# Patient Record
Sex: FEMALE | Race: OTHER | HISPANIC OR LATINO | URBAN - METROPOLITAN AREA
[De-identification: names, ages, dates, MRNs, and addresses within clinical notes are randomized per-mention and may not be internally consistent; named-entity substitution may affect disease eponyms.]

---

## 2021-06-04 ENCOUNTER — EMERGENCY (EMERGENCY)
Facility: HOSPITAL | Age: 26
LOS: 0 days | Discharge: HOME | End: 2021-06-04
Attending: EMERGENCY MEDICINE
Payer: MEDICAID

## 2021-06-04 VITALS
HEIGHT: 64 IN | OXYGEN SATURATION: 99 % | RESPIRATION RATE: 16 BRPM | WEIGHT: 147.05 LBS | HEART RATE: 75 BPM | TEMPERATURE: 98 F | SYSTOLIC BLOOD PRESSURE: 94 MMHG | DIASTOLIC BLOOD PRESSURE: 59 MMHG

## 2021-06-04 VITALS
DIASTOLIC BLOOD PRESSURE: 61 MMHG | RESPIRATION RATE: 18 BRPM | OXYGEN SATURATION: 99 % | HEART RATE: 71 BPM | SYSTOLIC BLOOD PRESSURE: 101 MMHG | TEMPERATURE: 98 F

## 2021-06-04 DIAGNOSIS — O99.891 OTHER SPECIFIED DISEASES AND CONDITIONS COMPLICATING PREGNANCY: ICD-10-CM

## 2021-06-04 DIAGNOSIS — R11.10 VOMITING, UNSPECIFIED: ICD-10-CM

## 2021-06-04 DIAGNOSIS — R55 SYNCOPE AND COLLAPSE: ICD-10-CM

## 2021-06-04 DIAGNOSIS — Z3A.24 24 WEEKS GESTATION OF PREGNANCY: ICD-10-CM

## 2021-06-04 LAB
ADD ON TEST-SPECIMEN IN LAB: SIGNIFICANT CHANGE UP
ALBUMIN SERPL ELPH-MCNC: 2.8 G/DL — LOW (ref 3.3–5)
ALP SERPL-CCNC: 101 U/L — SIGNIFICANT CHANGE UP (ref 40–120)
ALT FLD-CCNC: 114 U/L — HIGH (ref 12–78)
ANION GAP SERPL CALC-SCNC: 6 MMOL/L — SIGNIFICANT CHANGE UP (ref 5–17)
APPEARANCE UR: CLEAR — SIGNIFICANT CHANGE UP
AST SERPL-CCNC: 65 U/L — HIGH (ref 15–37)
BASOPHILS # BLD AUTO: 0.05 K/UL — SIGNIFICANT CHANGE UP (ref 0–0.2)
BASOPHILS NFR BLD AUTO: 0.5 % — SIGNIFICANT CHANGE UP (ref 0–2)
BILIRUB SERPL-MCNC: 0.2 MG/DL — SIGNIFICANT CHANGE UP (ref 0.2–1.2)
BILIRUB UR-MCNC: NEGATIVE — SIGNIFICANT CHANGE UP
BUN SERPL-MCNC: 7 MG/DL — SIGNIFICANT CHANGE UP (ref 7–23)
CALCIUM SERPL-MCNC: 8.4 MG/DL — LOW (ref 8.5–10.1)
CHLORIDE SERPL-SCNC: 109 MMOL/L — HIGH (ref 96–108)
CO2 SERPL-SCNC: 22 MMOL/L — SIGNIFICANT CHANGE UP (ref 22–31)
COLOR SPEC: YELLOW — SIGNIFICANT CHANGE UP
CREAT SERPL-MCNC: 0.51 MG/DL — SIGNIFICANT CHANGE UP (ref 0.5–1.3)
DIFF PNL FLD: NEGATIVE — SIGNIFICANT CHANGE UP
EOSINOPHIL # BLD AUTO: 0.07 K/UL — SIGNIFICANT CHANGE UP (ref 0–0.5)
EOSINOPHIL NFR BLD AUTO: 0.7 % — SIGNIFICANT CHANGE UP (ref 0–6)
GLUCOSE SERPL-MCNC: 73 MG/DL — SIGNIFICANT CHANGE UP (ref 70–99)
GLUCOSE UR QL: NEGATIVE MG/DL — SIGNIFICANT CHANGE UP
HCT VFR BLD CALC: 34.1 % — LOW (ref 34.5–45)
HGB BLD-MCNC: 11.6 G/DL — SIGNIFICANT CHANGE UP (ref 11.5–15.5)
IMM GRANULOCYTES NFR BLD AUTO: 0.6 % — SIGNIFICANT CHANGE UP (ref 0–1.5)
KETONES UR-MCNC: NEGATIVE — SIGNIFICANT CHANGE UP
LEUKOCYTE ESTERASE UR-ACNC: ABNORMAL
LIDOCAIN IGE QN: 93 U/L — SIGNIFICANT CHANGE UP (ref 73–393)
LYMPHOCYTES # BLD AUTO: 19.7 % — SIGNIFICANT CHANGE UP (ref 13–44)
LYMPHOCYTES # BLD AUTO: 2.04 K/UL — SIGNIFICANT CHANGE UP (ref 1–3.3)
MCHC RBC-ENTMCNC: 31.1 PG — SIGNIFICANT CHANGE UP (ref 27–34)
MCHC RBC-ENTMCNC: 34 GM/DL — SIGNIFICANT CHANGE UP (ref 32–36)
MCV RBC AUTO: 91.4 FL — SIGNIFICANT CHANGE UP (ref 80–100)
MONOCYTES # BLD AUTO: 0.58 K/UL — SIGNIFICANT CHANGE UP (ref 0–0.9)
MONOCYTES NFR BLD AUTO: 5.6 % — SIGNIFICANT CHANGE UP (ref 2–14)
NEUTROPHILS # BLD AUTO: 7.57 K/UL — HIGH (ref 1.8–7.4)
NEUTROPHILS NFR BLD AUTO: 72.9 % — SIGNIFICANT CHANGE UP (ref 43–77)
NITRITE UR-MCNC: NEGATIVE — SIGNIFICANT CHANGE UP
PH UR: 7 — SIGNIFICANT CHANGE UP (ref 5–8)
PLATELET # BLD AUTO: 326 K/UL — SIGNIFICANT CHANGE UP (ref 150–400)
POTASSIUM SERPL-MCNC: 3.7 MMOL/L — SIGNIFICANT CHANGE UP (ref 3.5–5.3)
POTASSIUM SERPL-SCNC: 3.7 MMOL/L — SIGNIFICANT CHANGE UP (ref 3.5–5.3)
PROT SERPL-MCNC: 6.7 GM/DL — SIGNIFICANT CHANGE UP (ref 6–8.3)
PROT UR-MCNC: 30 MG/DL
RAPID RVP RESULT: SIGNIFICANT CHANGE UP
RBC # BLD: 3.73 M/UL — LOW (ref 3.8–5.2)
RBC # FLD: 13.1 % — SIGNIFICANT CHANGE UP (ref 10.3–14.5)
SARS-COV-2 RNA SPEC QL NAA+PROBE: SIGNIFICANT CHANGE UP
SODIUM SERPL-SCNC: 137 MMOL/L — SIGNIFICANT CHANGE UP (ref 135–145)
SP GR SPEC: 1 — LOW (ref 1.01–1.02)
UROBILINOGEN FLD QL: NEGATIVE MG/DL — SIGNIFICANT CHANGE UP
WBC # BLD: 10.37 K/UL — SIGNIFICANT CHANGE UP (ref 3.8–10.5)
WBC # FLD AUTO: 10.37 K/UL — SIGNIFICANT CHANGE UP (ref 3.8–10.5)

## 2021-06-04 PROCEDURE — 99284 EMERGENCY DEPT VISIT MOD MDM: CPT | Mod: 25

## 2021-06-04 PROCEDURE — 85025 COMPLETE CBC W/AUTO DIFF WBC: CPT

## 2021-06-04 PROCEDURE — 76805 OB US >/= 14 WKS SNGL FETUS: CPT

## 2021-06-04 PROCEDURE — 86901 BLOOD TYPING SEROLOGIC RH(D): CPT

## 2021-06-04 PROCEDURE — 99285 EMERGENCY DEPT VISIT HI MDM: CPT

## 2021-06-04 PROCEDURE — 80053 COMPREHEN METABOLIC PANEL: CPT

## 2021-06-04 PROCEDURE — 76700 US EXAM ABDOM COMPLETE: CPT | Mod: 26

## 2021-06-04 PROCEDURE — 93005 ELECTROCARDIOGRAM TRACING: CPT

## 2021-06-04 PROCEDURE — 83690 ASSAY OF LIPASE: CPT

## 2021-06-04 PROCEDURE — 93010 ELECTROCARDIOGRAM REPORT: CPT

## 2021-06-04 PROCEDURE — 76805 OB US >/= 14 WKS SNGL FETUS: CPT | Mod: 26

## 2021-06-04 PROCEDURE — 82962 GLUCOSE BLOOD TEST: CPT

## 2021-06-04 PROCEDURE — 86900 BLOOD TYPING SEROLOGIC ABO: CPT

## 2021-06-04 PROCEDURE — 85379 FIBRIN DEGRADATION QUANT: CPT

## 2021-06-04 PROCEDURE — 87086 URINE CULTURE/COLONY COUNT: CPT

## 2021-06-04 PROCEDURE — 86850 RBC ANTIBODY SCREEN: CPT

## 2021-06-04 PROCEDURE — 76700 US EXAM ABDOM COMPLETE: CPT

## 2021-06-04 PROCEDURE — 36415 COLL VENOUS BLD VENIPUNCTURE: CPT

## 2021-06-04 PROCEDURE — 81001 URINALYSIS AUTO W/SCOPE: CPT

## 2021-06-04 PROCEDURE — 0225U NFCT DS DNA&RNA 21 SARSCOV2: CPT

## 2021-06-04 RX ORDER — SODIUM CHLORIDE 9 MG/ML
1000 INJECTION INTRAMUSCULAR; INTRAVENOUS; SUBCUTANEOUS ONCE
Refills: 0 | Status: COMPLETED | OUTPATIENT
Start: 2021-06-04 | End: 2021-06-04

## 2021-06-04 RX ADMIN — SODIUM CHLORIDE 1000 MILLILITER(S): 9 INJECTION INTRAMUSCULAR; INTRAVENOUS; SUBCUTANEOUS at 14:41

## 2021-06-04 NOTE — ED ADULT TRIAGE NOTE - CHIEF COMPLAINT QUOTE
Pt. to the ED C/O Syncope Episode while shopping at Store- Pt. C/O Lower Abdominal Pain with radiation to the back- Pt. reports to be 24 weeks pregnant- Dr. Gaviria GYN- Pt. reports + fetal Movement

## 2021-06-04 NOTE — ED PROVIDER NOTE - CLINICAL SUMMARY MEDICAL DECISION MAKING FREE TEXT BOX
27 y/o F with no significant PMHx  presents to the ED BIBA from a store for evaluation s/p syncopal episode . Pt reports feeling fine right now and has moderate back pain. Pt states she was in a store and was feeling dizzy, and (+) LOC.     Plan: 25 y/o F,  24 weeks gestation, otherwise with no significant PMHx  presents to the ED BIBA from a store for evaluation s/p syncopal episode . Pt reports feeling fine right now and has moderate back pain. Pt states she was in a store and was feeling dizzy, and (+) LOC.  Poor PO intake today while outside in hot weather. PE 9+) gravid. nontender. Neuro nonfocal.     Plan: EKG, US abd /OB. Labs including lipase, urine, RVP/COVID swab, TNS. IV fluid, monitor observe and reassess. fetal heart rate check ob consult.

## 2021-06-04 NOTE — ED PROVIDER NOTE - PATIENT PORTAL LINK FT
You can access the FollowMyHealth Patient Portal offered by [facility] by registering at the following website: [email or web address]. By joining [employer]’s FollowMyHealth portal, you will also be able to view your health information using other applications (apps) compatible with our system.

## 2021-06-04 NOTE — ED PROVIDER NOTE - CARE PLAN
Principal Discharge DX:	Syncope, unspecified syncope type  Secondary Diagnosis:	Third trimester pregnancy

## 2021-06-04 NOTE — ED PROVIDER NOTE - PSYCHIATRIC NEGATIVE STATEMENT, MLM
----- Message from Margaret Mendez MD sent at 12/19/2018  7:56 AM EST -----  Labs were stable except blood sugar 180 and A1c has jumped up to 8.1.   May want to increase Lantus to 66 units on a daily basis and recheck fasting blood sugar and A1c in 3 months no known mental health issues.

## 2021-06-04 NOTE — ED PROVIDER NOTE - ENMT, MLM
Normocephalic atraumatic head, oropharynx clear, mucus membranes slightly dry, no clinical evidence of facial injury,  negative roberts and negative raccoon

## 2021-06-04 NOTE — ED PROVIDER NOTE - MUSCULOSKELETAL, MLM
No focal tenderness, neck non tender supple without pain, no focal extremity tenderness or deformity, BL SLR 45 degrees, normal motor

## 2021-06-04 NOTE — ED ADULT NURSE NOTE - OBJECTIVE STATEMENT
Pt. to the ED s/p syncope while shopping at store. Pt. reports to be 24 weeks pregnant. +fetal movement. denies pain at this time. pt states that she ate breakfast this AM but has not eating lunch. does not feel like she was hydrating enough today.

## 2021-06-04 NOTE — ED PROVIDER NOTE - OBJECTIVE STATEMENT
25 y/o F with no significant PMHx  presents to the ED BIBA from a store for evaluation s/p syncopal episode . Pt reports feeling fine right now and has moderate back pain. Pt states she was in a store and was feeling dizzy, and (+) LOC. Duration of syncopal episode unknown. Unknown is any seizure activity. Pt denies a seizure hx. Pt is pregnant with 1 baby and this is the pt's second pregnancy. Pt denies experiencing problems with 1st pregnancy.  Pt reports pain below belly after LOC at the store. No vaginal bleeding or fever. No CP or SOB. Mild transient HA self resolved. Pt has not received COVID-19 vaccines.  Pt reports she ate breakfast this morning at 10AM and a consuming a little bit of water.  Pt is 24 weeks pregnant and reports positive fetal movement NKDA. OBGYN: Dr. Gaviria.  #:[id number]

## 2021-06-04 NOTE — ED PROVIDER NOTE - NSFOLLOWUPINSTRUCTIONS_ED_ALL_ED_FT
Drink more oral fluids.  Take it easy next 2 days.  Follow up Monday with Dr. Gaviria, your Ob doctor.        Síncope    LO QUE NECESITA SABER:    El síncope también se conoce edvin desmayo o pérdida de la consciencia. El síncope es la pérdida repentina y temporal de la consciencia, seguida por amrita caída estando en amrita posición de pie o sentado. El síncope puede no tener gravedad o ser amrita señal de amrita afección más grave que debe tratarse. Usted puede controlar ciertas afecciones médicas que provocan síncope. Gloria médicos pueden ayudarlo a crear un plan para controlar el síncope y evitar la ocurrencia de episodios.    INSTRUCCIONES SOBRE EL MARILEE HOSPITALARIA:    Busque atención médica de inmediato si:  •Está sangrando porque se golpeó la daryl cuando se desmayó.      •Usted repentinamente tiene doble visión, dificultad para hablar, adormecimiento y no puede  gloria brazos o piernas.      •Usted tiene dolor en el pecho y dificultad para respirar.      •Usted vomita sharyn o un material que parece café molido.      •Usted ve sharyn en gloria deposiciones.      Comuníquese con farrell médico si:  •Usted tiene nuevos síntomas o gloria síntomas empeoran.      •Tiene otro episodio de síncope.      •Tiene dolor de daryl, ritmo cardíaco acelerado o se siente demasiado mareado para ponerse pie.      •Usted tiene preguntas o inquietudes acerca de farrell condición o cuidado.      Medicamentos:  •Los medicamentospodrían administrarse para ayudar a que farrell corazón beverly con fuerza y regularidad. Farrell médico podría hacer cambios a cualquier medicamento que le esté causando síncope.      •Emison gloria medicamentos edvin se le haya indicado.Consulte con farrell médico si usted fam que farrell medicamento no le está ayudando o si presenta efectos secundarios. Infórmele si es alérgico a cualquier medicamento. Mantenga amrita lista actualizada de los medicamentos, las vitaminas y los productos herbales que dharmesh. Incluya los siguientes datos de los medicamentos: cantidad, frecuencia y motivo de administración. Traiga con usted la lista o los envases de las píldoras a gloria citas de seguimiento. Lleve la lista de los medicamentos con usted en anurag de amrita emergencia.      Acuda a gloria consultas de control con farrell médico según le indicaron.Anote gloria preguntas para que se acuerde de hacerlas ancelmo gloria visitas.    Control del síncope:  •Lleve un registro de los episodios de síncope.Incluya los síntomas y la actividad que realiza antes y después del episodio. El registro puede ayudar a farrell médico a determinar la causa del síncope y ayudarlo a usted a controlar los episodios.      •Siéntese o acuéstese cuando sea necesario.[place]ec incluye cuando se sienta mareado, farrell garganta se cierre o note cambios en farrell visión. Eleve gloria piernas por encima del nivel de farrell corazón.      •Inhale lenta y profundamente si comienza a respirar más rápido a causa de la ansiedad o el temor.Lebec puede disminuir el mareo y la sensación de que se va a desmayar.      •Revise farrell presión arterial con frecuencia.[place]ebec es importante si usted dharmesh medicamentos para bajar la presión arterial. Revise farrell presión arterial cuando esté acostado y cuando esté de pie. Pregunte con que frecuencia debe tomarse la presión ancelmo el día. Mantenga un registro de los valores numéricos de farrell presión arterial. Farrell médico puede usar la información del registro edvin amrita base para planificar farrell tratamiento.  Cómo agapito la presión arterial           Prevención de un episodio de síncope:  •Muévase lentamente y acostúmbrese a amrita posición antes de moverse a otra.Lebec es muy importante cuando usted se cambie de amrita posición acostada o sentada a amrita posición de pie. Respire profundo varias veces antes de ponerse de pie después de susy estado acostado. Póngase de pie lentamente. Los movimientos repentinos podrían causar desmayos. Siéntese en el borde de la cama o del sofá por unos minutos antes de ponerse de pie. En anurag que esté guardando cama, debe tratar de estar en amrita posición vertical por lo menos por 2 horas todos los días o edvin se lo indicaron. No inmovilice las piernas cuando esté de pie ancelmo un segun periodo de tiempo. Mueva las piernas y doble las rodillas para mantener el flujo de sharyn.      •Siga las recomendaciones de farrell médico.El médico puede recomendarle que ingiera más líquidos para evitar la deshidratación. Es probable que usted también deba aumentar farrell consumo de sal para evitar que farrell presión arterial baje demasiado y ocurra un síncope. El médico le dirá cuánto líquido y sodio debe consumir cada día. También le dirá cuánta actividad física es carrera para usted. [place] dependerá de la causa de farrell síncope.      •Esté atento a los signos de bajo nivel de azúcar en la sharyn.Los signos incluyen hambre, nerviosismo, sudoración y latidos cardíacos rápidos o palpitantes. Consulte con farrell médico sobre métodos para mantener estable farrell nivel de azúcar en la sharyn.      •No se esfuerce si está estreñido.Puede desmayarse si usted hace fuerza para realizar amrita evacuación intestinal. Caminar es lo mejor que usted puede hacer para que gloria intestinos se muevan. Consuma alimentos ricos en fibra para facilitar las evacuaciones intestinales. Los cereales altos en fibra, los frijoles, las verduras y los panes integrales son buenos ejemplos. El jugo de ciruelas pasas también puede suavizar las evacuaciones intestinales.      •Tenga cuidado cuando hace calor.El calor puede causar un episodio de síncope. Limite la actividad que realiza al aire pearl en días calurosos. La actividad física en días calurosos puede conducir a la deshidratación. [place]bec puede provocar un episodio.          Agotamiento por calor    LO QUE NECESITA SABER:    El agotamiento por calor es cuando el cuerpo se calienta en exceso. Normalmente, el cuerpo tiene un sistema de refrigeración que es controlado por el cerebro. El sistema de refrigeración se ajusta a las condiciones de calor y baja farrell temperatura corporal produciendo sudor. Con el golpe de calor, el sistema de enfriamiento del cuerpo no funciona tino y, edvin resultado, aumenta la temperatura corporal.    INSTRUCCIONES SOBRE EL MARILEE HOSPITALARIA:    Llame al número de emergencias local (911 en los [place]) si:  •Usted tiene dificultad para respirar.      •Usted está confundido o no puede pensar con claridad.      •Usted no puede  los brazos y las piernas.      Regrese a la kathy de emergencias si:  •Usted no puede dejar de vomitar.          Llame a farrell médico si:  •Gloria signos y síntomas no mejoran con el tratamiento.      •Usted siente entumecimiento u hormigueo en gloria brazos o piernas.      •Usted tiene preguntas o inquietudes acerca de farrell condición o cuidado.      Primeros auxilios para el agotamiento por calor:  •Muévase a un ambiente con aire acondicionado o un área fresca y sombreada y acuéstese. Eleve gloria piernas por encima del nivel de farrell corazón.      •Emison amrita bebida fría, edvin agua o bebidas deportivas.      •Rociése con agua fría o échese agua fresca en farrell daryl, efren y ropa.      •Afloje o quítese toda la ropa que sea posible.      •Si no se mejora dentro de 1 hora, vaya al departamento de emergencia.      Evite el agotamiento por calor:  •Vista ropa suelta, liviana y de colores keyon.      •Proteja farrell daryl y efren con un sombrero o sombrilla cuando esté afuera.      •Emison mucha agua o bebidas deportivas. Evite las bebidas alcohólicas.      •Coma alimentos salados, edvin galletas saladas y pretzel salados.      •Limite gloria actividades ancelmo las horas más calurosas del día. [place]bec generalmente es tarde en la mañana hasta temprano en la tarde.      •Utilice aire acondicionado o ventiladores y tenga suficiente ventilación. Si no hay aire acondicionado disponible, mantenga gloria ventanas abiertas para que el aire pueda circular.      Acuda a la consulta de control con farrell médico según las indicaciones:Anote gloria preguntas para que se acuerde de hacerlas ancelmo gloria visitas.            Tercer trimestre de embarazo    Third Trimester of Pregnancy       El tercer trimestre del embarazo comprende desde la semana 28 hasta la semana 40 (desde el mes 7 hasta el mes 9). En roman trimestre, el bebé en gestación (feto) crece muy rápidamente. Hacia el final del noveno mes, el bebé en gestación mide alrededor de 20 pulgadas (45 cm) de segun. Pesa entre 6 y 10 libras (2,70 y 4,50 kg).      Cambios en el cuerpo ancelmo el tercer trimestre    Ancelmo el tercer trimestre, farrell cuerpo continúa experimentando numerosos cambios. Estos cambios varían de amrita peewee a otra.    Cambios físicos     •Seguirá aumentando de peso. Puede ser que aumente entre 25 y 35 libras (11 y 16 kg) hacia el final del embarazo.      •Podrán aparecer las primeras estrías en las caderas, el vientre (abdomen) y las mamas.      •Las mamas seguirán creciendo y se pondrán más sensibles.      •El jarod se le puede engrosar o afinar, o tener amrita textura diferente. Lo probable es que el jarod se le normalice después del nacimiento del bebé.      •El ombligo puede salir hacia afuera.       •Puede observar que se le hinchan las maxim, el jason o los tobillos.      Cambios en la claudia     •Puede comenzar a tener o continuar teniendo acidez estomacal.      •Puede tener o continuar teniendo problemas para defecar (estreñimiento).      •Pueden aparecerle hemorroides. Estas son venas hinchadas en el ano que pueden picar o doler.      •Puede comenzar a tener venas hinchadas (venas varicosas) en las piernas.      •Puede presentar más dolor en la pelvis, la espalda o los muslos.      •Puede presentar más hormigueo o entumecimiento en las maxim, los brazos y las piernas. La piel de farrell vientre también puede sentirse entumecida.      •Es posible que sienta falta de aire a medida que el útero se agranda.      Otros cambios     •Es posible que rob pis (orine) con mayor frecuencia.      •Puede tener más problemas para dormir.      •Puede notar que el feto “baja” o lo siente moverse más bajo, en el vientre.      •Puede ser que le salga más líquido de la vagina.      •Puede sentir las articulaciones flojas y puede sentir dolor alrededor del hueso pélvico.        Qué debe esperar en las visitas prenatales  Verá al médico cada 2 semanas hasta la semana 36. Después de la semana 36, verá al médico todas las semanas. Ancelmo estas visitas, el médico:•Le hará un examen físico. [place] requiere el control de:  •Farrell peso.      •Los latidos cardíacos del bebé.      •El efren uterino, si está cerca de la fecha de parto.      •Le hará pruebas, edvin, por ejemplo:  •Análisis de sharyn.      •Prueba de estreptococos del holly B.      •Prueba no estresante (PNE). La prueba sin estrés controla la claudia del bebé para asegurarse de que no haya signos de problemas.        •Le hará preguntas sobre farrell claudia, farrell embarazo y farrell plan de nacimiento.        ¿Cuáles son los signos del trabajo de parto?  Si tiene alguno de estos signos, llame a farrell médico de inmediato, incluso si es antes de la fecha de parto.  •Cólicos en el abdomen.      •Endurecimiento del útero o contracciones regulares. Estas contracciones pueden comenzar en intervalos de 10 minutos y volverse más divya y más frecuentes con el tiempo.      •Contracciones que comienzan en la parte superior del útero y se extienden hacia abajo, a la tasha inferior del vientre y la espalda.      •Más presión en la pelvis y un dolor sordo en la parte baja de la espalda.      •Amrita mucosidad acuosa o con sharyn que sale de la vagina.      •Ruptura de la bolsa de ryland. [place] puede ser un chorro o un goteo hunter y lento de líquido. Informe al médico si tiene color o un olor extraño.        ¿Qué es el falso trabajo de parto?    El falso trabajo de parto es amrita afección en la que se sienten contracciones pequeñas e irregulares que generalmente desaparecen al hacer reposo, al cambiar de posición o al beber agua. Las contracciones pueden durar horas, días o incluso semanas, antes de que el verdadero trabajo de parto se inicie.  Vaya al médico si estas contracciones:  •Llegan a intervalos regulares.      •Se hacen cada vez más divya.      •Se vuelven dolorosas.        Siga estas instrucciones en farrell casa:    Medicamentos     •Use los medicamentos de venta pearl y los recetados solamente edvin se lo haya indicado el médico. Algunos medicamentos son seguros para agapito ancelmo el embarazo y otros no lo son.      •Emison vitaminas prenatales que contengan por lo menos 600 microgramos (mcg) de ácido fólico.        Comida y bebida    •Consuma comidas saludables que incluyan lo siguiente:  •Frutas y verduras frescas.      •Cereales integrales.      •Buenas forrester de proteínas, edvin carne, huevos y tofu.      •Productos lácteos con bajo contenido de grasa.        •No coma carne cruda ni quesos sin cocinar.      •Si no obtiene amrita cantidad suficiente de calcio de los alimentos que ingiere, consulte a farrell médico sobre la posibilidad de agapito un suplemento diario de calcio.      •Emison 4 o 5 comidas pequeñas en lugar de 3 comidas abundantes al día.    •Es posible que deba agapito medidas para prevenir o tratar los problemas para defecar:  •Beber suficiente líquido para mantener el pis (la orina) de color amarillo pálido.      •Usar medicamentos recetados o de venta pearl.       •Dickinson alimentos ricos en fibra. Entre ellos, frijoles, cereales integrales y frutas y verduras frescas.       •Limitar los alimentos con alto contenido de grasa y azúcar. Estos incluyen alimentos fritos o dulces.        Actividad     •Rob ejercicios solamente edvin se lo haya indicado el médico. Interrumpa la actividad física si comienza a tener cólicos en el útero.      •Evite levantar pesos excesivos.      • No rob ejercicio si hace demasiado calor, hay demasiada humedad o se encuentra en un lugar de mucha altura (altitud elevada).      •Use zapatos con tacones bajos. Mantenga amrita buena postura al sentarse y pararse.      •Puede continuar teniendo relaciones sexuales, a menos que el médico le indique lo contrario.      Alivio del dolor y del malestar     •Rob pausas con frecuencia y descanse con las piernas levantadas (elevadas) si tiene calambres en las piernas o dolor en la parte baja de la espalda.      •Dese jonna de asiento con agua tibia para aliviar el dolor o las molestias causadas por las hemorroides. Use amrita crema para las hemorroides si el médico la autoriza.      •Use un sostén que le brinde buen soporte si gloria mamas están sensibles.    •Si desarrolla venas hinchadas y abultadas en las piernas:  •Use medias elásticas de soporte o medias de compresión edvin se lo haya indicado el médico.      •Levante los pies ancelmo 15 minutos, 3 o 4 veces por día.      •Limite la sal en gloria alimentos.        Cuidado prenatal     •Escriba gloria preguntas. Llévelas cuando concurra a las visitas prenatales.      •Concurra a todas las visitas prenatales edvin se lo haya indicado el médico. [place] es importante.      Seguridad     • No rob viajes largos excepto que esté obligada, y solo si el médico lo autoriza.      • No se dé jonna de inmersión en Ute, jonna turcos ni saunas.      • No tres alcohol.      • No mantenga las piernas cruzadas ancelmo mucho tiempo.      • No se rob duchas vaginales ni use tampones o toallas higiénicas perfumadas.      •Use el cinturón de seguridad en todo momento mientras conduce.       •Hable con el médico si le preocupa la violencia familiar.      Preparación para la llegada del bebé   Para prepararse para la llegada de farrell bebé:  •Emison clases prenatales.      •Visite el hospital y recorra el área de maternidad.      •Compre un asiento de seguridad orientado hacia atrás para llevar al bebé en el automóvil. Aprenda cómo instalarlo en el auto.      •Prepare la habitación del bebé. Saque todas las almohadas y los animales de enriqueta de la cuna del bebé. Estos objetos pueden evitar que el bebé respire tino.      Instrucciones generales    •Evite el contacto con las bandejas sanitarias de los gatos y la fernando que estos animales usan. Estos elementos contienen gérmenes que pueden causar defectos congénitos al bebé y la posible pérdida del feto (aborto espontáneo) o muerte fetal.      •No consuma ningún producto que contenga nicotina o tabaco, edvin cigarrillos, cigarrillos electrónicos y tabaco de mascar. Si necesita ayuda para dejar de fumar, consulte al médico. Puede recibir asesoramiento u otro tipo de apoyo para dejar de fumar.      •No use medicamentos a base de hierbas ni drogas ilícitas, edvin tampoco medicamentos que no le haya indicado el médico. Las sustancias químicas de estos productos pueden afectar al bebé.        Dónde buscar más información    •American Pregnancy Association (Asociación Estadounidense del Embarazo): americanpregnancy.org        Comuníquese con un médico si:    •Tiene fiebre.      •Tiene cólicos leves o siente presión en la parte baja del vientre.      •Sufre un dolor persistente en el abdomen.      •Vomita o hace deposiciones acuosas (diarrea).      •Advierte un líquido con olor fétido que proviene de la vagina.      •Siente dolor al orinar o hace orina con mal olor.      •Tiene un dolor de daryl que no desaparece después de agapito analgésicos.      •Nota cambios en la visión o ve manchas torito de los ojos.        Solicite ayuda de inmediato si:    •Rompe la bolsa.      •Tiene contracciones regulares separadas por menos de 5 minutos.      •Tiene sangrado o pequeñas pérdidas vaginales.      •Tiene cólicos o dolor muy intensos en el vientre.      •Tiene dificultad para respirar.      •Siente dolor en el pecho.      •No ha sentido que el bebé se moviera edvin se lo dijo el médico.        Resumen    •El tercer trimestre comprende desde la semana 28 hasta la semana 40 (desde el mes 7 hasta el mes 9). Esta es la época en que el bebé en gestación crece muy rápidamente.      •Ancelmo roman período, las molestias pueden aumentar a medida que usted sube de peso y el bebé crece.      •Prepárese para la llegada del bebé tomando las clases prenatales, preparando todo lo que necesitará el bebé y arreglando la habitación del bebé.      •Solicite ayuda de inmediato si tiene sangrado por la vagina, siente dolor en el pecho y tiene dificultad para respirar, o si no ha sentido que farrell bebé se moviera edvin se lo indicó el médico.      Esta información no tiene edvin fin reemplazar el consejo del médico. Asegúrese de hacerle al médico cualquier pregunta que tenga.

## 2021-06-04 NOTE — ED PROVIDER NOTE - PROGRESS NOTE DETAILS
Scribe Jaclyn Casale for attending Dr Mora: case discussed with  OB on call, including ddimer results and other lab work and pelvic/abd ultrasound reports. shes not impressed with slightly elevated didmer, likely due to pregnancy its self. she advises no further OB monitoring indicated, agrees wit d/c home, increased PO fluids, rest, PMD OB follow up Monday. I Jaclyn casale attest that this documentation has been prepared under the direction and in the presence of Doctor Wheatleyo Scribe Jaclyn Casale for attending Dr Mora: case discussed with  OB on call, including ddimer results and other lab work and pelvic/abd ultrasound reports. shes not impressed with slightly elevated ddimer, likely due to pregnancy its self. she advises no further OB monitoring indicated, agrees with d/c home, increased PO fluids, rest, PMD OB follow up Monday.

## 2021-06-05 LAB
CULTURE RESULTS: SIGNIFICANT CHANGE UP
SPECIMEN SOURCE: SIGNIFICANT CHANGE UP

## 2021-08-18 PROBLEM — Z78.9 OTHER SPECIFIED HEALTH STATUS: Chronic | Status: ACTIVE | Noted: 2021-06-04

## 2021-08-23 ENCOUNTER — APPOINTMENT (OUTPATIENT)
Dept: ANTEPARTUM | Facility: CLINIC | Age: 26
End: 2021-08-23

## 2021-08-23 PROBLEM — Z00.00 ENCOUNTER FOR PREVENTIVE HEALTH EXAMINATION: Status: ACTIVE | Noted: 2021-08-23

## 2021-08-27 ENCOUNTER — APPOINTMENT (OUTPATIENT)
Dept: ANTEPARTUM | Facility: CLINIC | Age: 26
End: 2021-08-27
Payer: MEDICAID

## 2021-08-27 ENCOUNTER — ASOB RESULT (OUTPATIENT)
Age: 26
End: 2021-08-27

## 2021-08-27 PROCEDURE — 76816 OB US FOLLOW-UP PER FETUS: CPT

## 2021-09-16 ENCOUNTER — INPATIENT (INPATIENT)
Facility: HOSPITAL | Age: 26
LOS: 1 days | Discharge: HOME | DRG: 560 | End: 2021-09-18
Attending: OBSTETRICS & GYNECOLOGY | Admitting: OBSTETRICS & GYNECOLOGY
Payer: MEDICAID

## 2021-09-16 ENCOUNTER — TRANSCRIPTION ENCOUNTER (OUTPATIENT)
Age: 26
End: 2021-09-16

## 2021-09-16 VITALS — WEIGHT: 162.04 LBS | HEIGHT: 63 IN

## 2021-09-16 DIAGNOSIS — Z3A.00 WEEKS OF GESTATION OF PREGNANCY NOT SPECIFIED: ICD-10-CM

## 2021-09-16 DIAGNOSIS — O26.899 OTHER SPECIFIED PREGNANCY RELATED CONDITIONS, UNSPECIFIED TRIMESTER: ICD-10-CM

## 2021-09-16 LAB
BASOPHILS # BLD AUTO: 0.07 K/UL — SIGNIFICANT CHANGE UP (ref 0–0.2)
BASOPHILS NFR BLD AUTO: 0.6 % — SIGNIFICANT CHANGE UP (ref 0–2)
EOSINOPHIL # BLD AUTO: 0.28 K/UL — SIGNIFICANT CHANGE UP (ref 0–0.5)
EOSINOPHIL NFR BLD AUTO: 2.2 % — SIGNIFICANT CHANGE UP (ref 0–6)
HCT VFR BLD CALC: 38.8 % — SIGNIFICANT CHANGE UP (ref 34.5–45)
HGB BLD-MCNC: 13.4 G/DL — SIGNIFICANT CHANGE UP (ref 11.5–15.5)
IMM GRANULOCYTES NFR BLD AUTO: 0.8 % — SIGNIFICANT CHANGE UP (ref 0–1.5)
LYMPHOCYTES # BLD AUTO: 2.57 K/UL — SIGNIFICANT CHANGE UP (ref 1–3.3)
LYMPHOCYTES # BLD AUTO: 20.5 % — SIGNIFICANT CHANGE UP (ref 13–44)
MCHC RBC-ENTMCNC: 31 PG — SIGNIFICANT CHANGE UP (ref 27–34)
MCHC RBC-ENTMCNC: 34.5 GM/DL — SIGNIFICANT CHANGE UP (ref 32–36)
MCV RBC AUTO: 89.8 FL — SIGNIFICANT CHANGE UP (ref 80–100)
MONOCYTES # BLD AUTO: 0.82 K/UL — SIGNIFICANT CHANGE UP (ref 0–0.9)
MONOCYTES NFR BLD AUTO: 6.5 % — SIGNIFICANT CHANGE UP (ref 2–14)
NEUTROPHILS # BLD AUTO: 8.72 K/UL — HIGH (ref 1.8–7.4)
NEUTROPHILS NFR BLD AUTO: 69.4 % — SIGNIFICANT CHANGE UP (ref 43–77)
PLATELET # BLD AUTO: 287 K/UL — SIGNIFICANT CHANGE UP (ref 150–400)
RBC # BLD: 4.32 M/UL — SIGNIFICANT CHANGE UP (ref 3.8–5.2)
RBC # FLD: 12.5 % — SIGNIFICANT CHANGE UP (ref 10.3–14.5)
WBC # BLD: 12.56 K/UL — HIGH (ref 3.8–10.5)
WBC # FLD AUTO: 12.56 K/UL — HIGH (ref 3.8–10.5)

## 2021-09-16 PROCEDURE — 94760 N-INVAS EAR/PLS OXIMETRY 1: CPT

## 2021-09-16 PROCEDURE — C1889: CPT

## 2021-09-16 PROCEDURE — 59050 FETAL MONITOR W/REPORT: CPT

## 2021-09-16 PROCEDURE — 86901 BLOOD TYPING SEROLOGIC RH(D): CPT

## 2021-09-16 PROCEDURE — G0463: CPT

## 2021-09-16 PROCEDURE — 86900 BLOOD TYPING SEROLOGIC ABO: CPT

## 2021-09-16 PROCEDURE — 86850 RBC ANTIBODY SCREEN: CPT

## 2021-09-16 PROCEDURE — 86780 TREPONEMA PALLIDUM: CPT

## 2021-09-16 PROCEDURE — 83735 ASSAY OF MAGNESIUM: CPT

## 2021-09-16 PROCEDURE — U0005: CPT

## 2021-09-16 PROCEDURE — 36415 COLL VENOUS BLD VENIPUNCTURE: CPT

## 2021-09-16 PROCEDURE — 85014 HEMATOCRIT: CPT

## 2021-09-16 PROCEDURE — 58300 INSERT INTRAUTERINE DEVICE: CPT

## 2021-09-16 PROCEDURE — 85025 COMPLETE CBC W/AUTO DIFF WBC: CPT

## 2021-09-16 PROCEDURE — 86769 SARS-COV-2 COVID-19 ANTIBODY: CPT

## 2021-09-16 PROCEDURE — U0003: CPT

## 2021-09-16 PROCEDURE — 85018 HEMOGLOBIN: CPT

## 2021-09-16 RX ORDER — HYDROCORTISONE 1 %
1 OINTMENT (GRAM) TOPICAL EVERY 6 HOURS
Refills: 0 | Status: DISCONTINUED | OUTPATIENT
Start: 2021-09-16 | End: 2021-09-18

## 2021-09-16 RX ORDER — AER TRAVELER 0.5 G/1
1 SOLUTION RECTAL; TOPICAL EVERY 4 HOURS
Refills: 0 | Status: DISCONTINUED | OUTPATIENT
Start: 2021-09-16 | End: 2021-09-18

## 2021-09-16 RX ORDER — OXYTOCIN 10 UNIT/ML
333.33 VIAL (ML) INJECTION
Qty: 20 | Refills: 0 | Status: DISCONTINUED | OUTPATIENT
Start: 2021-09-16 | End: 2021-09-18

## 2021-09-16 RX ORDER — IBUPROFEN 200 MG
600 TABLET ORAL EVERY 6 HOURS
Refills: 0 | Status: COMPLETED | OUTPATIENT
Start: 2021-09-16 | End: 2022-08-15

## 2021-09-16 RX ORDER — MAGNESIUM HYDROXIDE 400 MG/1
30 TABLET, CHEWABLE ORAL
Refills: 0 | Status: DISCONTINUED | OUTPATIENT
Start: 2021-09-16 | End: 2021-09-18

## 2021-09-16 RX ORDER — DIPHENHYDRAMINE HCL 50 MG
25 CAPSULE ORAL EVERY 6 HOURS
Refills: 0 | Status: DISCONTINUED | OUTPATIENT
Start: 2021-09-16 | End: 2021-09-18

## 2021-09-16 RX ORDER — INFLUENZA VIRUS VACCINE 15; 15; 15; 15 UG/.5ML; UG/.5ML; UG/.5ML; UG/.5ML
0.5 SUSPENSION INTRAMUSCULAR ONCE
Refills: 0 | Status: DISCONTINUED | OUTPATIENT
Start: 2021-09-16 | End: 2021-09-18

## 2021-09-16 RX ORDER — KETOROLAC TROMETHAMINE 30 MG/ML
30 SYRINGE (ML) INJECTION ONCE
Refills: 0 | Status: DISCONTINUED | OUTPATIENT
Start: 2021-09-16 | End: 2021-09-17

## 2021-09-16 RX ORDER — SODIUM CHLORIDE 9 MG/ML
3 INJECTION INTRAMUSCULAR; INTRAVENOUS; SUBCUTANEOUS EVERY 8 HOURS
Refills: 0 | Status: DISCONTINUED | OUTPATIENT
Start: 2021-09-16 | End: 2021-09-18

## 2021-09-16 RX ORDER — SIMETHICONE 80 MG/1
80 TABLET, CHEWABLE ORAL EVERY 4 HOURS
Refills: 0 | Status: DISCONTINUED | OUTPATIENT
Start: 2021-09-16 | End: 2021-09-18

## 2021-09-16 RX ORDER — LANOLIN
1 OINTMENT (GRAM) TOPICAL EVERY 6 HOURS
Refills: 0 | Status: DISCONTINUED | OUTPATIENT
Start: 2021-09-16 | End: 2021-09-18

## 2021-09-16 RX ORDER — OXYCODONE HYDROCHLORIDE 5 MG/1
5 TABLET ORAL ONCE
Refills: 0 | Status: DISCONTINUED | OUTPATIENT
Start: 2021-09-16 | End: 2021-09-18

## 2021-09-16 RX ORDER — SODIUM CHLORIDE 9 MG/ML
1000 INJECTION, SOLUTION INTRAVENOUS
Refills: 0 | Status: DISCONTINUED | OUTPATIENT
Start: 2021-09-16 | End: 2021-09-17

## 2021-09-16 RX ORDER — BENZOCAINE 10 %
1 GEL (GRAM) MUCOUS MEMBRANE EVERY 6 HOURS
Refills: 0 | Status: DISCONTINUED | OUTPATIENT
Start: 2021-09-16 | End: 2021-09-18

## 2021-09-16 RX ORDER — OXYCODONE HYDROCHLORIDE 5 MG/1
5 TABLET ORAL
Refills: 0 | Status: DISCONTINUED | OUTPATIENT
Start: 2021-09-16 | End: 2021-09-18

## 2021-09-16 RX ORDER — PRAMOXINE HYDROCHLORIDE 150 MG/15G
1 AEROSOL, FOAM RECTAL EVERY 4 HOURS
Refills: 0 | Status: DISCONTINUED | OUTPATIENT
Start: 2021-09-16 | End: 2021-09-18

## 2021-09-16 RX ORDER — COPPER 313.4 MG/1
1 INTRAUTERINE DEVICE INTRAUTERINE ONCE
Refills: 0 | Status: COMPLETED | OUTPATIENT
Start: 2021-09-16 | End: 2021-09-16

## 2021-09-16 RX ORDER — DIBUCAINE 1 %
1 OINTMENT (GRAM) RECTAL EVERY 6 HOURS
Refills: 0 | Status: DISCONTINUED | OUTPATIENT
Start: 2021-09-16 | End: 2021-09-18

## 2021-09-16 RX ORDER — CITRIC ACID/SODIUM CITRATE 300-500 MG
30 SOLUTION, ORAL ORAL ONCE
Refills: 0 | Status: DISCONTINUED | OUTPATIENT
Start: 2021-09-16 | End: 2021-09-17

## 2021-09-16 RX ORDER — TETANUS TOXOID, REDUCED DIPHTHERIA TOXOID AND ACELLULAR PERTUSSIS VACCINE, ADSORBED 5; 2.5; 8; 8; 2.5 [IU]/.5ML; [IU]/.5ML; UG/.5ML; UG/.5ML; UG/.5ML
0.5 SUSPENSION INTRAMUSCULAR ONCE
Refills: 0 | Status: DISCONTINUED | OUTPATIENT
Start: 2021-09-16 | End: 2021-09-18

## 2021-09-16 RX ORDER — ACETAMINOPHEN 500 MG
975 TABLET ORAL
Refills: 0 | Status: DISCONTINUED | OUTPATIENT
Start: 2021-09-16 | End: 2021-09-18

## 2021-09-16 RX ADMIN — COPPER 1 EACH: 313.4 INTRAUTERINE DEVICE INTRAUTERINE at 23:30

## 2021-09-16 NOTE — DISCHARGE NOTE OB - PLAN OF CARE
Nothing per vagina for six weeks.  Follow up in office in 4-6 weeks for postpartum exam and IUD check.

## 2021-09-16 NOTE — DISCHARGE NOTE OB - MEDICATION SUMMARY - MEDICATIONS TO TAKE
I will START or STAY ON the medications listed below when I get home from the hospital:    ibuprofen 600 mg oral tablet  -- 1 tab(s) by mouth every 6 hours, As needed, Moderate Pain -for moderate pain MDD:4  -- Indication: For for moderate pain, as needed    Prenatal 1 Plus 1 oral tablet  -- 1 tab(s) by mouth once a day  -- Indication: For continue daily

## 2021-09-16 NOTE — DISCHARGE NOTE OB - CARE PROVIDER_API CALL
Kory Gaviria  OBSTETRICS AND GYNECOLOGY  [address], [address] [place] [address]  Phone: [phone]  Fax: [phone]  Established Patient  Follow Up Time: 1 month

## 2021-09-16 NOTE — DISCHARGE NOTE OB - PATIENT PORTAL LINK FT
You can access the FollowMyHealth Patient Portal offered by [facility] by registering at the following website: [email or web address]. By joining [facility]’s FollowMyHealth portal, you will also be able to view your health information using other applications (apps) compatible with our system.

## 2021-09-16 NOTE — DISCHARGE NOTE OB - CARE PLAN
Principal Discharge DX:	Vaginal delivery  Assessment and plan of treatment:	Nothing per vagina for six weeks.  Follow up in office in 4-6 weeks for postpartum exam and IUD check.   1

## 2021-09-17 LAB
COVID-19 SPIKE DOMAIN AB INTERP: POSITIVE
COVID-19 SPIKE DOMAIN ANTIBODY RESULT: 9.38 U/ML — HIGH
HCT VFR BLD CALC: 34.1 % — LOW (ref 34.5–45)
HGB BLD-MCNC: 11.9 G/DL — SIGNIFICANT CHANGE UP (ref 11.5–15.5)
MAGNESIUM SERPL-MCNC: 1.8 MG/DL — SIGNIFICANT CHANGE UP (ref 1.6–2.6)
SARS-COV-2 IGG+IGM SERPL QL IA: 9.38 U/ML — HIGH
SARS-COV-2 IGG+IGM SERPL QL IA: POSITIVE
T PALLIDUM AB TITR SER: NEGATIVE — SIGNIFICANT CHANGE UP

## 2021-09-17 RX ORDER — IBUPROFEN 200 MG
600 TABLET ORAL EVERY 6 HOURS
Refills: 0 | Status: DISCONTINUED | OUTPATIENT
Start: 2021-09-17 | End: 2021-09-18

## 2021-09-17 RX ADMIN — Medication 600 MILLIGRAM(S): at 12:27

## 2021-09-17 RX ADMIN — Medication 975 MILLIGRAM(S): at 09:19

## 2021-09-17 RX ADMIN — Medication 975 MILLIGRAM(S): at 21:00

## 2021-09-17 RX ADMIN — Medication 975 MILLIGRAM(S): at 01:02

## 2021-09-17 RX ADMIN — Medication 975 MILLIGRAM(S): at 15:41

## 2021-09-17 RX ADMIN — Medication 600 MILLIGRAM(S): at 06:17

## 2021-09-17 RX ADMIN — Medication 30 MILLIGRAM(S): at 00:27

## 2021-09-17 RX ADMIN — Medication 600 MILLIGRAM(S): at 18:09

## 2021-09-17 RX ADMIN — Medication 1 TABLET(S): at 09:20

## 2021-09-17 RX ADMIN — Medication 1000 MILLIUNIT(S)/MIN: at 00:26

## 2021-09-17 RX ADMIN — Medication 600 MILLIGRAM(S): at 12:28

## 2021-09-17 RX ADMIN — Medication 600 MILLIGRAM(S): at 05:43

## 2021-09-17 RX ADMIN — Medication 975 MILLIGRAM(S): at 20:23

## 2021-09-18 VITALS
TEMPERATURE: 98 F | HEART RATE: 73 BPM | DIASTOLIC BLOOD PRESSURE: 79 MMHG | RESPIRATION RATE: 17 BRPM | OXYGEN SATURATION: 97 % | SYSTOLIC BLOOD PRESSURE: 116 MMHG

## 2021-09-18 LAB — MAGNESIUM SERPL-MCNC: 1.9 MG/DL — SIGNIFICANT CHANGE UP (ref 1.6–2.6)

## 2021-09-18 RX ORDER — IBUPROFEN 200 MG
1 TABLET ORAL
Qty: 30 | Refills: 1
Start: 2021-09-18 | End: 2021-11-16

## 2021-09-18 RX ADMIN — Medication 600 MILLIGRAM(S): at 06:30

## 2021-09-18 RX ADMIN — Medication 975 MILLIGRAM(S): at 10:12

## 2021-09-18 RX ADMIN — Medication 600 MILLIGRAM(S): at 05:43

## 2021-09-18 RX ADMIN — Medication 1 TABLET(S): at 12:29

## 2021-09-18 RX ADMIN — Medication 600 MILLIGRAM(S): at 00:12

## 2021-09-18 RX ADMIN — Medication 600 MILLIGRAM(S): at 01:00

## 2021-09-18 RX ADMIN — Medication 600 MILLIGRAM(S): at 13:20

## 2021-09-18 RX ADMIN — Medication 600 MILLIGRAM(S): at 12:29

## 2021-09-18 RX ADMIN — Medication 975 MILLIGRAM(S): at 11:05

## 2021-09-22 DIAGNOSIS — Z3A.39 39 WEEKS GESTATION OF PREGNANCY: ICD-10-CM

## 2021-09-22 DIAGNOSIS — Z28.09 IMMUNIZATION NOT CARRIED OUT BECAUSE OF OTHER CONTRAINDICATION: ICD-10-CM

## 2021-09-22 DIAGNOSIS — Z30.430 ENCOUNTER FOR INSERTION OF INTRAUTERINE CONTRACEPTIVE DEVICE: ICD-10-CM

## 2022-02-03 NOTE — ED PROVIDER NOTE - TEMPLATE, MLM
Patient requested that this provider call her.     Patient states that she picked up her paper prescription (as requested) for Vyvanse 60 mg yesterday. She states she went out to eat with her friend and then when going to the pharmacy realized that she has lost the prescription. She states that she often misplaces/loses things when not on her medication. She reports looking in her purse, her car, and the restaurant and has not been able to find the prescription. She did not get it filled.     Patient is informed that as the prescription was for controlled 2 substance, this provider is not able to give another prescription at this time. Patient expresses understanding. This provider states she will discuss the issue with her supervising physician.      General

## 2022-10-26 NOTE — PATIENT PROFILE OB - CONTRAINDICATIONS & PRECAUTIONS (SELECT ALL THAT APPLY)
COIVD positive over a weeks ago from another hospital admission  not in resp distress, saturating well on RA  DC Isolation none...

## 2025-02-20 ENCOUNTER — RESULT REVIEW (OUTPATIENT)
Age: 30
End: 2025-02-20